# Patient Record
Sex: FEMALE | Race: WHITE | Employment: FULL TIME | URBAN - METROPOLITAN AREA
[De-identification: names, ages, dates, MRNs, and addresses within clinical notes are randomized per-mention and may not be internally consistent; named-entity substitution may affect disease eponyms.]

---

## 2023-10-23 ENCOUNTER — OFFICE VISIT (OUTPATIENT)
Dept: SURGERY | Facility: CLINIC | Age: 68
End: 2023-10-23

## 2023-10-23 VITALS — HEIGHT: 67 IN | BODY MASS INDEX: 39.24 KG/M2 | WEIGHT: 250 LBS

## 2023-10-23 DIAGNOSIS — N39.41 URGENCY INCONTINENCE: ICD-10-CM

## 2023-10-23 DIAGNOSIS — N39.0 RECURRENT UTI: Primary | ICD-10-CM

## 2023-10-23 LAB
BILIRUB UR QL: NEGATIVE
CLARITY UR: CLEAR
GLUCOSE UR-MCNC: NORMAL MG/DL
HGB UR QL STRIP.AUTO: NEGATIVE
KETONES UR-MCNC: NEGATIVE MG/DL
LEUKOCYTE ESTERASE UR QL STRIP.AUTO: NEGATIVE
NITRITE UR QL STRIP.AUTO: NEGATIVE
PH UR: 6.5 [PH] (ref 5–8)
PROT UR-MCNC: NEGATIVE MG/DL
SP GR UR STRIP: 1.02 (ref 1–1.03)
UROBILINOGEN UR STRIP-ACNC: NORMAL

## 2023-10-23 PROCEDURE — 99204 OFFICE O/P NEW MOD 45 MIN: CPT | Performed by: UROLOGY

## 2023-10-23 RX ORDER — HYDROCHLOROTHIAZIDE 25 MG/1
25 TABLET ORAL DAILY
COMMUNITY

## 2023-10-23 RX ORDER — TRAMADOL HYDROCHLORIDE 50 MG/1
50 TABLET ORAL EVERY 6 HOURS
COMMUNITY
Start: 2023-05-12

## 2023-10-23 RX ORDER — ATENOLOL 50 MG/1
50 TABLET ORAL DAILY
COMMUNITY

## 2023-10-23 RX ORDER — WARFARIN SODIUM 2 MG/1
1 TABLET ORAL DAILY
COMMUNITY
Start: 2023-09-13

## 2023-10-23 RX ORDER — WARFARIN SODIUM 1 MG/1
TABLET ORAL
COMMUNITY
Start: 2021-09-14

## 2023-10-23 RX ORDER — SIMVASTATIN 20 MG
TABLET ORAL
COMMUNITY
Start: 2023-08-14

## 2023-10-23 RX ORDER — WARFARIN SODIUM 5 MG/1
1-2 TABLET ORAL DAILY
COMMUNITY
Start: 2022-04-11

## 2023-10-23 RX ORDER — IRBESARTAN 300 MG/1
TABLET ORAL
COMMUNITY
Start: 2022-11-22

## 2023-10-23 NOTE — PROGRESS NOTES
Kathy Ramachandran MD  Department of Urology  [facility] [place]    T: [phone]  F: [phone]    To: No primary care provider on file. No primary provider on file. Re: Terrance Vila   MRN: [id number]  : 10/15/1955    Dear No primary care provider on file.,    Today I had the pleasure of seeing Terrance Vila in my clinic. As you know, Ms. Wia Humphries is a pleasant 80year old year old female who I am seeing for urinary incontinence. Patient was last seen in this department on Visit date not found. Briefly, patient states that for several years she has had urinary leakage mainly stress. Recently after some trips abroad she has had some urgency urinary incontinence/urinary incontinence without sensory awareness. She also has had some urinary tract infections. PAST MEDICAL HISTORY:  Past Medical History:   Diagnosis Date    Breast cancer in situ, left 2015    Unspecified essential hypertension         PAST SURGICAL HISTORY:  No past surgical history on file. ALLERGIES:  No Known Allergies      MEDICATIONS:  No current outpatient medications     FAMILY HISTORY:  Family History   Problem Relation Age of Onset    Cancer Father     Hypertension Mother         SOCIAL HISTORY:  Social History     Socioeconomic History    Marital status:    Tobacco Use    Smoking status: Never    Smokeless tobacco: Never   Substance and Sexual Activity    Alcohol use: Yes     Comment: x6/week    Drug use: No          PHYSICAL EXAMINATION:  There were no vitals filed for this visit.   CONSTITUTIONAL: No apparent distress, cooperative and communicative  NEUROLOGIC: Alert and oriented   HEAD: Normocephalic, atraumatic   EYES: Sclera non-icteric   ENT: Hearing intact, moist mucous membranes   NECK: No obvious goiter or masses   RESPIRATORY: Normal respiratory effort, Nonlabored breathing on room air  SKIN: No evident rashes   ABDOMEN: Soft, nontender, nondistended, no rebound tenderness, no guarding, no masses      REVIEW OF SYSTEMS:    A comprehensive 10-point review of systems was completed. Pertinent positives and negatives are noted in the the HPI. LABORATORY DATA:  none        IMAGING REVIEW:  PROCEDURE:CT PE Chest Angiography    HISTORY:   Suspected pulmonary embolism; history of left breast cancer. Shortness of breath and mild hypoxia. TECHNIQUE:  Initial pre-contrast  and localizer images were obtained. Contrast enhanced helical CT pulmonary angiography images were then obtained. Routine transaxial and post-processed (multiplanar and/or MIP) reformations were obtained. COMPARISON:  None available    FINDINGS:    CT Pulmonary angiography:         Quality of exam: Diagnostic       Pulmonary embolism: There is extensive pulmonary arterial clot burden, including a large central saddle embolus and multiple filling defects extending in all lumbar levels and several segmental and subsegmental levels. Right heart strain: There is suggestion of intraventricular septal flattening and reflux of contrast into the hepatic veins. Main Pulmonary Artery: Enlarged. Main Pulmonary Artery diameter: 3.5 cm. Heart: The heart is normal in size. Great vessels: The thoracic aorta is normal in caliber. Support devices: There is a right port-a-cath. Lower cervical region: No adenopathy is seen in the visualized portion of the lower cervical region. Lymph nodes: There is an 8 mm mediastinal lymph node just anterior to the proximal left main pulmonary artery (4/80). Additional small, scattered mediastinal lymph nodes are noted. There is a 1 cm left hilar lymph node (4/91). Pleural spaces/diaphragm: There is no pleural effusion. Lungs: The trachea and main central airways are patent. Subsegmental atelectasis is seen, more prominent at the dependent portions and at the lung bases.  A rounded subpleural area of consolidation measuring 2.4 cm at the medial right lung base may  represent atelectasis versus focal pulmonary infarct (5/122). Upper GI tract: The esophagus is grossly unremarkable. Body wall: No lytic or blastic lesions are seen. There is a 1.7 x 1.6 cm finely trabeculated lesion centered at the posterior aspect of the T12 vertebral body, most consistent with an intraosseous hemangioma. Upper abdomen: There is hepatic steatosis, with fatty sparing near the gallbladder fossa. The visualized portions of the spleen, adrenal glands, and pancreas are unremarkable. A 4.2 cm cystic structure projected from the posterior superior pole of the  right kidney is incompletely visualized (4/208). An additional low-density lesion in the superior pole of the right kidney is too small to characterize (4/196). Key: (S/I) = series number / image number    IMPRESSION:    1. EXTENSIVE PULMONARY EMBOLIC CLOT BURDEN, INCLUDING A SADDLE EMBOLUS IN THE MAIN PULMONARY ARTERY, AND INVOLVEMENT OF ALL LOBAR PULMONARY ARTERIES. 2.  A SMALL AREA OF CONSOLIDATION AT THE MEDIAL RIGHT LUNG BASE IS MOST CONSISTENT WITH A SMALL PULMONARY INFARCT. 3.  DILATION OF THE MAIN PULMONARY ARTERY, INTRAVENTRICULAR SEPTAL FLATTENING, AND REFLUX OF CONTRAST INTO THE HEPATIC VEINS. THESE FINDINGS CAN BE SEEN IN THE SETTING OF RIGHT HEART STRAIN. ECHOCARDIOGRAPHY SHOULD BE CONSIDERED. 4. MILDLY PROMINENT MEDIASTINAL AND LEFT HILAR LYMPH NODES, NOT ENLARGED BY SIZE CRITERIA. ATTENTION ON FOLLOW-UP IMAGING IS RECOMMENDED. 5.  HEPATIC STEATOSIS. 6.  A 4.2 CM CYSTIC STRUCTURE PROJECTING FROM THE POSTERIOR SUPERIOR ASPECT OF THE RIGHT KIDNEY IS INCOMPLETELY VISUALIZED. On immediate review of images by Dr. Scout Bradshaw at 1425 hours, the patient was transferred via wheelchair to the emergency room and the findings were discussed with the patient's oncologist, Dr. Jesse Irving and the ED charge nurse, Inessa Leung.   FINAL REPORT  THE ATTENDING RADIOLOGIST INTERPRETED THIS STUDY WITH THE RESIDENT  WHOSE NAME APPEARS BELOW, AND FULLY AGREES WITH THE REPORT  AND HAS AMENDED THE REPORT WHEN NECESSARY:    Attending Radiologist:  Noni Barger MD, Balaji Fine MD  Radiology Resident:  Beatriz Andrews MD, Darren Marin  SSD  Date Signed Off:  04/11/2016 17:27  Transc. by:  SIERRA  04/11/2016 16:38  Dictated by:  Beatriz Andrews MD, [facility]  04/11/2016 16:38  Procedure Note    Skylar Miranda MD - 02/28/2018  Formatting of this note might be different from the original.  PROCEDURE:CT PE Chest Angiography    HISTORY:   Suspected pulmonary embolism; history of left breast cancer. Shortness of breath and mild hypoxia. TECHNIQUE:  Initial pre-contrast  and localizer images were obtained. Contrast enhanced helical CT pulmonary angiography images were then obtained. Routine transaxial and post-processed (multiplanar and/or MIP) reformations were obtained. COMPARISON:  None available    FINDINGS:    CT Pulmonary angiography:         Quality of exam: Diagnostic       Pulmonary embolism: There is extensive pulmonary arterial clot burden, including a large central saddle embolus and multiple filling defects extending in all lumbar levels and several segmental and subsegmental levels. Right heart strain: There is suggestion of intraventricular septal flattening and reflux of contrast into the hepatic veins. Main Pulmonary Artery: Enlarged. Main Pulmonary Artery diameter: 3.5 cm. Heart: The heart is normal in size. Great vessels: The thoracic aorta is normal in caliber. Support devices: There is a right port-a-cath. Lower cervical region: No adenopathy is seen in the visualized portion of the lower cervical region. Lymph nodes: There is an 8 mm mediastinal lymph node just anterior to the proximal left main pulmonary artery (4/80). Additional small, scattered mediastinal lymph nodes are noted.  There is a 1 cm left hilar lymph node (4/91). Pleural spaces/diaphragm: There is no pleural effusion. Lungs: The trachea and main central airways are patent. Subsegmental atelectasis is seen, more prominent at the dependent portions and at the lung bases. A rounded subpleural area of consolidation measuring 2.4 cm at the medial right lung base may  represent atelectasis versus focal pulmonary infarct (5/122). Upper GI tract: The esophagus is grossly unremarkable. Body wall: No lytic or blastic lesions are seen. There is a 1.7 x 1.6 cm finely trabeculated lesion centered at the posterior aspect of the T12 vertebral body, most consistent with an intraosseous hemangioma. Upper abdomen: There is hepatic steatosis, with fatty sparing near the gallbladder fossa. The visualized portions of the spleen, adrenal glands, and pancreas are unremarkable. A 4.2 cm cystic structure projected from the posterior superior pole of the  right kidney is incompletely visualized (4/208). An additional low-density lesion in the superior pole of the right kidney is too small to characterize (4/196). Key: (S/I) = series number / image number    IMPRESSION:    1. EXTENSIVE PULMONARY EMBOLIC CLOT BURDEN, INCLUDING A SADDLE EMBOLUS IN THE MAIN PULMONARY ARTERY, AND INVOLVEMENT OF ALL LOBAR PULMONARY ARTERIES. 2.  A SMALL AREA OF CONSOLIDATION AT THE MEDIAL RIGHT LUNG BASE IS MOST CONSISTENT WITH A SMALL PULMONARY INFARCT. 3.  DILATION OF THE MAIN PULMONARY ARTERY, INTRAVENTRICULAR SEPTAL FLATTENING, AND REFLUX OF CONTRAST INTO THE HEPATIC VEINS. THESE FINDINGS CAN BE SEEN IN THE SETTING OF RIGHT HEART STRAIN. ECHOCARDIOGRAPHY SHOULD BE CONSIDERED. 4. MILDLY PROMINENT MEDIASTINAL AND LEFT HILAR LYMPH NODES, NOT ENLARGED BY SIZE CRITERIA. ATTENTION ON FOLLOW-UP IMAGING IS RECOMMENDED. 5.  HEPATIC STEATOSIS. 6.  A 4.2 CM CYSTIC STRUCTURE PROJECTING FROM THE POSTERIOR SUPERIOR ASPECT OF THE RIGHT KIDNEY IS INCOMPLETELY VISUALIZED.       On immediate review of images by Dr. Jesús Rooney at 1425 hours, the patient was transferred via wheelchair to the emergency room and the findings were discussed with the patient's oncologist, Dr. Katherin Quiroz and the ED charge nurse, Emily Avitia. FINAL REPORT  THE ATTENDING RADIOLOGIST INTERPRETED THIS STUDY WITH THE RESIDENT  WHOSE NAME APPEARS BELOW, AND FULLY AGREES WITH THE REPORT  AND HAS AMENDED THE REPORT WHEN NECESSARY:    Attending Radiologist:  Charles Carmen MD, Ana Paula Mueller MD  Radiology Resident:  Ena Colon MD, Cheo Culver  SSD  Date Signed Off:  04/11/2016 17:27  Transc. by:  TR  04/11/2016 16:38  Dictated by:  Rg Oconnell MD, [facility]  04/11/2016 16:38  Exam End: 04/11/16 11:59 PM Last Resulted: 04/11/16 11:59 PM   Received From: [facility]  Result Rec        OTHER RELEVANT DATA:   none     IMPRESSION: Lower urinary tract symptoms namely urinary incontinence without sensory awareness and recurrent urinary tract infections. Recommended behavioral management and offered trial of mirabegron 50 mg daily for presumed urgency urinary incontinence. She will return to clinic in 3 months after starting the medication. Recurrent UTI  We talked about UTI prevention with continuing good hydration, starting a women's probiotic (lactobacillus), Cranberry pills (pamphlet provided), bowel regimen (colace, senna, miralax), voiding before and after sexual activity and using pH balanced soaps. Can continue to check urine and treat when UCx is positive. If this persists,  can consider initiating low dose antibiotic prophylaxis for 6 months versus gentamicin irrigations if she would like a more local therapy. We will also initiate vaginal estrace cream every night for 1 week followed by every other night indefinitely,      Continuous antimicrobial prophylaxis regimens for women with recurrent UTIs have been recommended by several trials.   The dosing options for continuous prophylaxis includes the following:    TMP 100mg once daily  TMO-SMX 40mg/200mg once daily  TMP-SMX 40mg 200mg thrice weekly  Nitrofurantoin monohydrate/macrocrystals 50mg daily  Nitrofurantoin onohydrate/macrocrystals 100mg daily  Cephalexin 125mg once daily  Cephalexin 250mg once daily  Fosfomycin 3g every 10 days     These regimens can continue for 3 to 6 months. Recommended instructions for antibiotic prophylaxis related to sexual intercourse include taking a single dose of antibiotic immediately before or after sexual intercourse. Dosing options for prophylaxis includes the following:     TMP-SMX 40mg/200mg  TMP-SMX 80mg/400mg  Nitrofurantoin monohydrate/macrocrystals 50mg - 100mg  Cephalexin 250mg    Urgency incontinence/incontinence without sensory awareness  Discussed treatment options for urgency urinary incontinence including behavioral management (timed voiding, double voiding, avoiding bladder irritants, constipation management), antimuscarinics (side effects include dry eyes, dry mouth, constipation, mental fogginess), beta 3 agonist (the side effects include hypertension), bladder Botox, PTNS, sacral neuromodulation. Behavioral management includes timed voiding (going to the bathroom on a schedule every 1-4 hours), double voiding (trying to pee twice), avoiding bladder irritants (coffee, tea, soda, pop, alcohol), compression stockings, elevation of feet, voiding prior to bedtime, avoiding fluids 2 to 4 hours before bedtime and constipation management. For constipation management she can consider fruits (especially ones that start with \"P\"), vegetables, flaxseeds, hydration, fiber, MiraLAX, Colace or senna. She can also use a squatty potty to help with efficiency of stooling. I also suggested she try Estrace cream every night for 1 week followed by every other night indefinitely for genitourinary syndrome of menopause.   This may help with her urgency, frequency and urinary incontinence and help prevent recurrent urinary tract infections. She knows that behavioral management and Estrace cream can take 6 to 12 months to work. It may not be a complete solution but should improve her symptoms. If she has absolutely no improvement in her symptoms, we can proceed with the evaluation test including a cystoscopy, pelvic examination and possible urodynamic evaluation versus trialing medical therapy. Patient understands antimuscarinics and beta 3 agonist take 6 to 8 weeks to start working. Patient also understands the bladder Botox has the side effect of urinary retention in 10 to 12% of patients and does not start working for about 2 weeks post procedure. Additionally it wears off with time and repeat treatments may be required every 3 to 12 months. Patient understand that sacroneuromodulation is a surgical treatment, and that PTNS requires a significant amount of time commitment coming in every week for 12 weeks followed by monthly treatments if it is effective. Stress urinary incontinence  For stress urinary incontinence, I discussed the treatment options of behavioral management (Kegel exercises, weight loss, avoidance of constipation, avoidance of strong coughing, timed voiding, double voiding), incontinence pessary, mid urethral sling, urethral bulking agent. PLAN:  Behavioral management  Patient to check with her oncologist if she can take Estrace cream  Mirabegron 50 mg daily  Return to clinic in 3 months  UA reflex to culture    Thank you for referring this very pleasant patient to my clinic. If you have any questions or concerns, please do not hesitate to contact me. Sincerely,  Kathy Ramachandran MD    40 minutes were spent on this patient at this visit obtaining a history, reviewing medical records, developing a treatment plan, counseling and discussing treatment strategy with patient, coordination of care and documentation.      The [facility] makes medical notes available to patients in the interest of transparency. However, please be advised that this is a medical document. It is intended as a peer to peer communication. It is written in medical language and may contain abbreviations or verbiage that are technical and unfamiliar. It may appear blunt or direct. Medical documents are intended to carry relevant information, facts as evident, and the clinical opinion of the practitioner.

## 2024-01-20 ENCOUNTER — PATIENT MESSAGE (OUTPATIENT)
Dept: SURGERY | Facility: CLINIC | Age: 69
End: 2024-01-20

## 2024-02-28 ENCOUNTER — TELEPHONE (OUTPATIENT)
Dept: SURGERY | Facility: CLINIC | Age: 69
End: 2024-02-28

## 2024-02-28 DIAGNOSIS — N39.41 URGENCY INCONTINENCE: ICD-10-CM

## 2024-02-28 NOTE — TELEPHONE ENCOUNTER
From: Sandra Bermudez  To: Tena Mederos  Sent: 1/20/2024 12:14 PM CST  Subject: Medication     The Myrbetriq went up to nearly $500 per month in 2024. Is there a way to get this medication cheaper. I had thought Kasia in your office could help. How do I talk about options with her?

## 2024-02-28 NOTE — TELEPHONE ENCOUNTER
- TE created and routed to urology staff/Ninoska Noe, RN  [facility] Clinical Staff19 hours ago (5:45 PM)     KN  Pt never received a reply.     Tena Mederos MD [place], Nina, ELIZABETH3 weeks ago     Can you help her     MD Sandra Pruett3 weeks ago       Hi Ms. Bermudez  I was forwarded your message by our nurses last night. I will send this message over to Kasia to see if she can help. If you do not hear from her, please call our office and ask for Kasia.     I hope this helps!  Arin Villalpando, [facility]  Tena Mederos MD3 weeks ago     GC  Please advise on pt's [employer] message. Looks like she sent one back in November and no one ever replied. Thank you.     Sandra HODGE [facility] Clinical Staff (supporting Tena Mederos MD)1 month ago     EM  The Myrbetriq went up to nearly $500 per month in 2024. Is there a way to get this medication cheaper. I had thought Kasia in your office could help. How do I talk about options with her?

## 2024-03-06 NOTE — TELEPHONE ENCOUNTER
I called pt and informed her to try to set up an account with [facility] and choose the generic Myrbetriq and the # of tabs she wants to purchase and then pay for the med and call me back to let me know she completed it and I will then fax [facility] a script. She was thankful.

## 2024-03-11 ENCOUNTER — PATIENT MESSAGE (OUTPATIENT)
Dept: SURGERY | Facility: CLINIC | Age: 69
End: 2024-03-11

## 2024-03-13 NOTE — TELEPHONE ENCOUNTER
From: Sandra Bermudez  To: Tena Mederos  Sent: 3/11/2024 5:23 PM CDT  Subject: Myrbetriq    I spoke with Kasia regarding the [facility] and set up my account. I now need the prescription to be sent to the [facility]. I left a message through the general number but I want to make sure that Kasia is aware that everything is done on my end. Please advise.

## 2024-03-18 NOTE — TELEPHONE ENCOUNTER
I called pt to confirm that this is the coorect fax # as it failed twice and she checked her email and I determined that the  wrote the # incorrectly and the Fax # is actually [phone]. I faxed again to this # and received a fax conf.

## 2024-03-18 NOTE — TELEPHONE ENCOUNTER
I reordered and printed the Mirabegron 50 mg script and faxed it to the [facility] fax # the pt provided and I wrote the order # on the script and I received a fax failure. I faxed again and       Marquez Surekha   MR    3/14/24 11:32 AM  Note  Per pt requesting medication to go to [facility] fax: [phone], order # [id number]

## 2025-02-10 ENCOUNTER — TELEPHONE (OUTPATIENT)
Dept: SURGERY | Facility: CLINIC | Age: 70
End: 2025-02-10

## 2025-02-10 NOTE — TELEPHONE ENCOUNTER
Pt called name/ verified after reading the email that Medicare will no longer be covering telehealth services pt is then asking what the cost of the telehealth visit is if she pays out of pocket. I told pt that I will find that information out and will call her back tomorrow. Pt is thankful for the call.

## 2025-02-10 NOTE — TELEPHONE ENCOUNTER
Patient asking for 2/13 appointment be a telehealth visit. Patient states if Dr. Mederos is unable to do a video visit that day if she can schedule for the next available dates/times that is available for video visit. Please call.

## 2025-02-10 NOTE — TELEPHONE ENCOUNTER
Please ask sandi if ok to schedule video visit, as I know that there are changes in our policy at [facility]. If ok to schedule video ( I have not seen her in over a year) then I am ok with that.

## 2025-02-10 NOTE — TELEPHONE ENCOUNTER
Dr. ALVAREZ pt asking to change office visit on 2/13/25 to a video visit please advise. Her LOV with you was 10/23/23 I copied your plan from that visit below.     PLAN:  Behavioral management  Patient to check with her oncologist if she can take Estrace cream  Mirabegron 50 mg daily  Return to clinic in 3 months  UA reflex to culture

## 2025-02-11 NOTE — TELEPHONE ENCOUNTER
LMTCB   We can go ahead and schedule pt for a video visit with Dr. Mederos. Video visits can be done with Medicare until 3/31/25

## 2025-02-11 NOTE — TELEPHONE ENCOUNTER
I called pt verified name/ informed pt after confirming with PSR at  and RN, Lead Lizy we can schedule pt for a video visit with Dr. ALVAREZ but must be done by 3/31/25. Scheduled pt for 25 at 3:15, appointment booked and call ended.

## 2025-02-17 ENCOUNTER — TELEMEDICINE (OUTPATIENT)
Dept: SURGERY | Facility: CLINIC | Age: 70
End: 2025-02-17
Payer: MEDICARE

## 2025-02-17 DIAGNOSIS — N39.41 URGENCY INCONTINENCE: Primary | ICD-10-CM

## 2025-02-17 RX ORDER — MIRABEGRON 50 MG/1
50 TABLET, FILM COATED, EXTENDED RELEASE ORAL DAILY
Qty: 90 TABLET | Refills: 3 | Status: SHIPPED | OUTPATIENT
Start: 2025-02-17 | End: 2026-02-12

## 2025-02-17 NOTE — PROGRESS NOTES
Tena Mederos MD  Department of Urology  [address] [place] [address] Rd[address]  [place] [address]    T: [phone]  F: [phone]    To: No primary care provider on file.   No primary provider on file.    Re: Sandra Bermudez   MRN: [id number]  : 10/15/1955    Dear No primary care provider on file.,    Today I had the pleasure of seeing Sandra Bermudez in my clinic. As you know, Ms. Bermudez is a pleasant 69 year old year old female who I am seeing for followup. Patient was last seen in this department on Visit date not found.    Briefly,  patient states that for several years she has had urinary leakage mainly stress.  Recently after some trips abroad she has had some urgency urinary incontinence/urinary incontinence without sensory awareness.  She also has had some urinary tract infections.      She has not been seen by me since 2023.  Plan at that visit was to work on behavioral management, check with her primary care provider if she continues in Estrace cream, start mirabegron and return to clinic in 3 months.  She did not return to clinic in 3 months.    Today she states that she is taking behavioral management, mirabegron, estrace cream. She feels that she does have some occasional symptoms.       PAST MEDICAL HISTORY:  Past Medical History:    Breast cancer in situ, left    Unspecified essential hypertension        PAST SURGICAL HISTORY:  No past surgical history on file.      ALLERGIES:  Allergies[1]      MEDICATIONS:  Current Outpatient Medications   Medication Instructions    atenolol (TENORMIN) 50 mg, Oral, Daily    hydroCHLOROthiazide 25 mg, Oral, Daily    Irbesartan 300 MG Oral Tab     Mirabegron ER (MYRBETRIQ) 50 mg, Oral, Daily    simvastatin 20 MG Oral Tab     traMADol (ULTRAM) 50 mg, Oral, Every 6 hours    warfarin 1 MG Oral Tab To daily, as directed  Indications: obstruction of a blood vessel by a blood clot    warfarin 2 MG Oral Tab 1 tablet, Oral, Daily    warfarin 5 MG  Oral Tab 1-2 tablets, Oral, Daily        FAMILY HISTORY:  Family History   Problem Relation Age of Onset    Cancer Father     Hypertension Mother         SOCIAL HISTORY:  Social History     Socioeconomic History    Marital status:    Tobacco Use    Smoking status: Never    Smokeless tobacco: Never   Substance and Sexual Activity    Alcohol use: Yes     Comment: x6/week    Drug use: No          PHYSICAL EXAMINATION:  There were no vitals filed for this visit.  CONSTITUTIONAL: No apparent distress, cooperative and communicative  NEUROLOGIC: Alert and oriented   HEAD: Normocephalic, atraumatic   EYES: Sclera non-icteric   ENT: Hearing intact, moist mucous membranes   NECK: No obvious goiter or masses   RESPIRATORY: Normal respiratory effort, Nonlabored breathing on room air  SKIN: No evident rashes   ABDOMEN: Soft, nontender, nondistended, no rebound tenderness, no guarding, no masses      REVIEW OF SYSTEMS:    A comprehensive 10-point review of systems was completed.  Pertinent positives and negatives are noted in the the HPI.       LABORATORY DATA:      Component  Ref Range & Units 10/23/23  4:19 PM   Urine Color  Yellow Light-Yellow   Clarity Urine  Clear Clear   Spec Gravity  1.005 - 1.030 1.018   Glucose Urine  Normal mg/dL Normal   Bilirubin Urine  Negative Negative   Ketones Urine  Negative mg/dL Negative   Blood Urine  Negative Negative   pH Urine  5.0 - 8.0 6.5   Protein Urine  Negative mg/dL Negative   Urobilinogen Urine  Normal Normal   Nitrite Urine  Negative Negative   Leukocyte Esterase Urine  Negative Negative   Microscopic Microscopic not indicated              IMAGING REVIEW:  none     OTHER RELEVANT DATA:   none     IMPRESSION: Lower urinary tract symptoms currently on mirabegron 50 mg with behavioral management with good effect.     At last visit, discussed treatment options for urgency urinary incontinence including behavioral management (timed voiding, double voiding, avoiding bladder  irritants, constipation management), antimuscarinics (side effects include dry eyes, dry mouth, constipation, mental fogginess), beta 3 agonist (the side effects include hypertension), bladder Botox, PTNS, sacral neuromodulation.    Behavioral management includes timed voiding (going to the bathroom on a schedule every 1-4 hours), double voiding (trying to pee twice), avoiding bladder irritants (coffee, tea, soda, pop, alcohol), compression stockings, elevation of feet, voiding prior to bedtime, avoiding fluids 2 to 4 hours before bedtime and constipation management.     For constipation management she can consider fruits (especially ones that start with \"P\"), vegetables, flaxseeds, hydration, fiber, MiraLAX, Colace or senna.  She can also use a squatty potty to help with efficiency of stooling.     I also suggested she try Estrace cream every night for 1 week followed by every other night indefinitely for genitourinary syndrome of menopause.  This may help with her urgency, frequency and urinary incontinence and help prevent recurrent urinary tract infections.    She knows that behavioral management and Estrace cream can take 6 to 12 months to work.  It may not be a complete solution but should improve her symptoms.  If she has absolutely no improvement in her symptoms, we can proceed with the evaluation test including a cystoscopy, pelvic examination and possible urodynamic evaluation versus trialing medical therapy.    Patient understands antimuscarinics and beta 3 agonist take 6 to 8 weeks to start working.  Patient also understands the bladder Botox has the side effect of urinary retention in 10 to 12% of patients and does not start working for about 2 weeks post procedure.  Additionally it wears off with time and repeat treatments may be required every 3 to 12 months.  Patient understand that sacroneuromodulation is a surgical treatment, and that PTNS requires a significant amount of time commitment coming in  every week for 12 weeks followed by monthly treatments if it is effective.      PLAN:  Mirabegron 50mg daily - refilled  Behavioral management  Estrace cream - patient to let us know if she wants this  1 year followup with us for refill    Thank you for referring this very pleasant patient to my clinic. If you have any questions or concerns, please do not hesitate to contact me.    Sincerely,  Tena Mederos MD    30 minutes were spent on this patient at this visit obtaining a history, reviewing medical records, developing a treatment plan, counseling and discussing treatment strategy with patient, coordination of care and documentation.     The 21st Century Cures Act makes medical notes available to patients in the interest of transparency.  However, please be advised that this is a medical document.  It is intended as a peer to peer communication.  It is written in medical language and may contain abbreviations or verbiage that are technical and unfamiliar.  It may appear blunt or direct.  Medical documents are intended to carry relevant information, facts as evident, and the clinical opinion of the practitioner.         [1] No Known Allergies

## 2025-02-18 ENCOUNTER — TELEPHONE (OUTPATIENT)
Dept: SURGERY | Facility: CLINIC | Age: 70
End: 2025-02-18

## 2025-02-18 NOTE — TELEPHONE ENCOUNTER
Pt called.  Appointment yesterday 2-17-25.  Pt requesting the rx. Myrbetriq sent to [facility].   Has been sent there in the past.  Please call pt

## 2025-02-19 RX ORDER — MIRABEGRON 50 MG/1
50 TABLET, FILM COATED, EXTENDED RELEASE ORAL DAILY
Qty: 90 TABLET | Refills: 3 | Status: SHIPPED | OUTPATIENT
Start: 2025-02-19 | End: 2026-02-14

## 2025-02-19 NOTE — TELEPHONE ENCOUNTER
LMTCB    We need to verify which [facility] patient means, and we need their fax number so we can fax the medication.

## 2025-02-19 NOTE — TELEPHONE ENCOUNTER
Patient calling back and states the name of the pharmacy is [facility] and the fax number is [phone], and patient states their customer ID number [id number] needs to be included in the fax. Please advise.